# Patient Record
Sex: FEMALE | Race: WHITE | Employment: OTHER | URBAN - METROPOLITAN AREA
[De-identification: names, ages, dates, MRNs, and addresses within clinical notes are randomized per-mention and may not be internally consistent; named-entity substitution may affect disease eponyms.]

---

## 2017-01-02 ENCOUNTER — OFFICE VISIT (OUTPATIENT)
Dept: ORTHOPEDICS | Facility: CLINIC | Age: 82
End: 2017-01-02

## 2017-01-02 VITALS
BODY MASS INDEX: 31.5 KG/M2 | WEIGHT: 196 LBS | DIASTOLIC BLOOD PRESSURE: 62 MMHG | HEIGHT: 66 IN | SYSTOLIC BLOOD PRESSURE: 122 MMHG

## 2017-01-02 DIAGNOSIS — M17.31 POST-TRAUMATIC OSTEOARTHRITIS OF RIGHT KNEE: Primary | ICD-10-CM

## 2017-01-02 RX ORDER — TRIAMCINOLONE ACETONIDE 40 MG/ML
40 INJECTION, SUSPENSION INTRA-ARTICULAR; INTRAMUSCULAR ONCE
Qty: 2 ML | Refills: 0 | OUTPATIENT
Start: 2017-01-02 | End: 2017-01-02

## 2017-01-02 NOTE — PROGRESS NOTES
" Subjective:   Diandra Beck is a 82 year old female who complains of right knee pain, requesting an injection. Her last injection was 10/2014 with Dr. Mcclellan.   More achey stiff pain in the knee with stairs and at night.  Overall continued to walk daily.  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    Background:   Date of injury: None   Duration of symptoms: 10 years  Mechanism of Injury: Chronic; Unknown   Aggravating factors: Stairs  Relieving Factors: Biofreeze  Prior Evaluation: Prior Physician Evalutation: Dr. Jules and X-rays    PAST MEDICAL, SOCIAL, SURGICAL AND FAMILY HISTORY: She  has a past medical history of NONSPECIFIC MEDICAL HISTORY (10/4/89); NONSPECIFIC MEDICAL HISTORY (1/9/94); NONSPECIFIC MEDICAL HISTORY (9/94); NONSPECIFIC MEDICAL HISTORY (1/99); and Malignant neoplasm of breast (female), unspecified site.  She  has past surgical history that includes EXCISION BREAST LESION, OPEN >=1; EXCISION BREAST LESION, OPEN >=1; APPENDECTOMY; REMOVAL GALLBLADDER; and DILATION/CURETTAGE DIAG/THER NON OB (2001).  Her family history includes Alcohol/Drug in her brother; Breast Cancer in her sister; CANCER in her mother; DIABETES in her brother; HEART DISEASE in her brother and brother.  She reports that she has never smoked. She has never used smokeless tobacco. She reports that she does not drink alcohol or use illicit drugs.    ALLERGIES: She is allergic to codeine and no known drug allergies.    CURRENT MEDICATIONS: She has a current medication list which includes the following prescription(s): anastrozole, atorvastatin, and acetaminophen.     REVIEW OF SYSTEMS: 3 point review of systems is negative except as noted above.     Exam:   /62 mmHg  Ht 5' 6\" (1.676 m)  Wt 196 lb (88.905 kg)  BMI 31.65 kg/m2     MUSCULOSKELETAL:   R knee no effusion or redness  Tender medial, lateral joint space and pf facets  AROM 3-125       Assessment/Plan:   R knee " osteoarthritis    Plan  Patient requests repeat repeat knee steroid injection today.  Last in 2014 helped with goals of walking.    Plan  Risks and benefits reviewed including bleeding and < 1 % change of infection and patient wished to proceed.    A steroid injection was performed at R knee using 8cc 1% plain Lidocaine and 80 mg of Kenalog. This was well tolerated.  We can repeat after 3 months as needed    Angel Brock MD CAQ

## 2017-01-02 NOTE — Clinical Note
"  1/2/2017      RE: Diandra Beck  [address] [place] [address]        Subjective:   Diandra Beck is a 82 year old female who complains of right knee pain, requesting an injection. Her last injection was 10/2014 with Dr. Mcclellan.   More achey stiff pain in the knee with stairs and at night.  Overall continued to walk daily.  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    Background:   Date of injury: None   Duration of symptoms: 10 years  Mechanism of Injury: Chronic; Unknown   Aggravating factors: Stairs  Relieving Factors: Biofreeze  Prior Evaluation: Prior Physician Evalutation: Dr. Jules and X-rays    PAST MEDICAL, SOCIAL, SURGICAL AND FAMILY HISTORY: She  has a past medical history of NONSPECIFIC MEDICAL HISTORY (10/4/89); NONSPECIFIC MEDICAL HISTORY (1/9/94); NONSPECIFIC MEDICAL HISTORY (9/94); NONSPECIFIC MEDICAL HISTORY (1/99); and Malignant neoplasm of breast (female), unspecified site.  She  has past surgical history that includes EXCISION BREAST LESION, OPEN >=1; EXCISION BREAST LESION, OPEN >=1; APPENDECTOMY; REMOVAL GALLBLADDER; and DILATION/CURETTAGE DIAG/THER NON OB (2001).  Her family history includes Alcohol/Drug in her brother; Breast Cancer in her sister; CANCER in her mother; DIABETES in her brother; HEART DISEASE in her brother and brother.  She reports that she has never smoked. She has never used smokeless tobacco. She reports that she does not drink alcohol or use illicit drugs.    ALLERGIES: She is allergic to codeine and no known drug allergies.    CURRENT MEDICATIONS: She has a current medication list which includes the following prescription(s): anastrozole, atorvastatin, and acetaminophen.     REVIEW OF SYSTEMS: 3 point review of systems is negative except as noted above.     Exam:   /62 mmHg  Ht 5' 6\" (1.676 m)  Wt 196 lb (88.905 kg)  BMI 31.65 kg/m2     MUSCULOSKELETAL:   R knee no effusion or redness  Tender medial, lateral joint " space and pf facets  AROM 3-125       Assessment/Plan:   R knee osteoarthritis    Plan  Patient requests repeat repeat knee steroid injection today.  Last in 2014 helped with goals of walking.    Plan  Risks and benefits reviewed including bleeding and < 1 % change of infection and patient wished to proceed.    A steroid injection was performed at R knee using 8cc 1% plain Lidocaine and 80 mg of Kenalog. This was well tolerated.  We can repeat after 3 months as needed    Angel Brock MD CAQ

## 2017-01-02 NOTE — NURSING NOTE
[address] [address]  Dept: [phone]  ______________________________________________________________________________    Patient: Diandra Beck   : 10/29/1934   MRN: [id number]   2017    INVASIVE PROCEDURE SAFETY CHECKLIST    Date: 2017   Procedure: Right knee CSI with Kenalog  Patient Name: Diandra Beck  MRN: [id number]  YOB: 1934    Action: Complete sections as appropriate. Any discrepancy results in a HARD COPY until resolved.     PRE PROCEDURE:  Patient ID verified with 2 identifiers (name and  or MRN): Yes  Procedure and site verified with patient/designee (when able): Yes  Accurate consent documentation in medical record: Yes  H&P (or appropriate assessment) documented in medical record: Yes  H&P must be up to 20 days prior to procedure and updates within 24 hours of procedure as applicable: NA  Relevant diagnostic and radiology test results appropriately labeled and displayed as applicable: Yes  Procedure site(s) marked with provider initials: NA    TIMEOUT:  Time-Out performed immediately prior to starting procedure, including verbal and active participation of all team members addressing the following:Yes  * Correct patient identify  * Confirmed that the correct side and site are marked  * An accurate procedure consent form  * Agreement on the procedure to be done  * Correct patient position  * Relevant images and results are properly labeled and appropriately displayed  * The need to administer antibiotics or fluids for irrigation purposes during the procedure as applicable   * Safety precautions based on patient history or medication use    DURING PROCEDURE: Verification of correct person, site, and procedures any time the responsibility for care of the patient is transferred to another member of the care team.

## 2017-06-03 ENCOUNTER — HEALTH MAINTENANCE LETTER (OUTPATIENT)
Age: 82
End: 2017-06-03

## 2017-06-13 ENCOUNTER — TRANSFERRED RECORDS (OUTPATIENT)
Dept: HEALTH INFORMATION MANAGEMENT | Facility: CLINIC | Age: 82
End: 2017-06-13

## 2017-07-10 ENCOUNTER — TRANSFERRED RECORDS (OUTPATIENT)
Dept: HEALTH INFORMATION MANAGEMENT | Facility: CLINIC | Age: 82
End: 2017-07-10

## 2017-08-30 ENCOUNTER — OFFICE VISIT (OUTPATIENT)
Dept: ORTHOPEDICS | Facility: CLINIC | Age: 82
End: 2017-08-30

## 2017-08-30 VITALS — RESPIRATION RATE: 16 BRPM | WEIGHT: 187 LBS | BODY MASS INDEX: 30.05 KG/M2 | HEIGHT: 66 IN

## 2017-08-30 DIAGNOSIS — M17.11 PRIMARY LOCALIZED OSTEOARTHROSIS, LOWER LEG, RIGHT: Primary | ICD-10-CM

## 2017-08-30 RX ORDER — LETROZOLE 2.5 MG/1
2.5 TABLET, FILM COATED ORAL DAILY
COMMUNITY

## 2017-08-30 RX ORDER — TRIAMCINOLONE ACETONIDE 40 MG/ML
40 INJECTION, SUSPENSION INTRA-ARTICULAR; INTRAMUSCULAR ONCE
Qty: 1 ML | Refills: 0 | OUTPATIENT
Start: 2017-08-30 | End: 2017-08-30

## 2017-08-30 NOTE — NURSING NOTE
[address] [address]  Dept: [phone]  ______________________________________________________________________________    Patient: Diandra Beck   : 10/29/1934   MRN: [id number]   2017    INVASIVE PROCEDURE SAFETY CHECKLIST    Date: 2017   Procedure:Right knee kenalog injection  Patient Name: Diandra Beck  MRN: [id number]  YOB: 1934    Action: Complete sections as appropriate. Any discrepancy results in a HARD COPY until resolved.     PRE PROCEDURE:  Patient ID verified with 2 identifiers (name and  or MRN): Yes  Procedure and site verified with patient/designee (when able): Yes  Accurate consent documentation in medical record: Yes  H&P (or appropriate assessment) documented in medical record: Yes  H&P must be up to 20 days prior to procedure and updates within 24 hours of procedure as applicable: NA  Relevant diagnostic and radiology test results appropriately labeled and displayed as applicable: Yes  Procedure site(s) marked with provider initials: NA    TIMEOUT:  Time-Out performed immediately prior to starting procedure, including verbal and active participation of all team members addressing the following:Yes  * Correct patient identify  * Confirmed that the correct side and site are marked  * An accurate procedure consent form  * Agreement on the procedure to be done  * Correct patient position  * Relevant images and results are properly labeled and appropriately displayed  * The need to administer antibiotics or fluids for irrigation purposes during the procedure as applicable   * Safety precautions based on patient history or medication use    DURING PROCEDURE: Verification of correct person, site, and procedures any time the responsibility for care of the patient is transferred to another member of the care team.     The following medication was given:     MEDICATION:  Kenalog 40 mg; 4 ml of 1%  lidocaine  ROUTE: Intra-Articular  SITE: Right knee  DOSE: Kenalog 40 mg; 4 ml of 1% lidocaine  LOT #: Kenalog: [id number]; Lidocaine: [id number]  :Kenalog: MANGO BCN; Lidocaine: [employer]  EXPIRATION DATE: Kenalo/19; Lidocaine: 3/21  NDC#: [id number]; Lidocaine: 64324-326-60   Was there drug waste? Yes  Amount of drug waste (mL): 16.  Reason for waste:  Single use vial Lidocaine      Jony Ricks ATC  2017

## 2017-08-30 NOTE — PROGRESS NOTES
" Subjective:   Diandra Beck is a 82 year old female who is here requesting a right knee CSI. Her last injection lasted 9 months.     Date of injury: NA  Date last seen: Visit date not found  Following Therapeutic Plan: Yes CSI on 1/2/17  Pain: Unchanged  Function: Unchanged  Interval History:      PAST MEDICAL, SOCIAL, SURGICAL AND FAMILY HISTORY: She  has a past medical history of Malignant neoplasm of breast (female), unspecified site; NONSPECIFIC MEDICAL HISTORY (10/4/89); NONSPECIFIC MEDICAL HISTORY (1/9/94); NONSPECIFIC MEDICAL HISTORY (9/94); and NONSPECIFIC MEDICAL HISTORY (1/99).  She  has a past surgical history that includes EXCISION BREAST LESION, OPEN >=1; EXCISION BREAST LESION, OPEN >=1; APPENDECTOMY; REMOVAL GALLBLADDER; and DILATION/CURETTAGE DIAG/THER NON OB (2001).  Her family history includes Alcohol/Drug in her brother; Breast Cancer in her sister; CANCER in her mother; DIABETES in her brother; HEART DISEASE in her brother and brother.  She reports that she has never smoked. She has never used smokeless tobacco. She reports that she does not drink alcohol or use illicit drugs.      ALLERGIES: She is allergic to codeine and no known drug allergies.    CURRENT MEDICATIONS: She has a current medication list which includes the following prescription(s): cholecalciferol, ribociclib succinate, letrozole, atorvastatin, acetaminophen, and anastrozole.     REVIEW OF SYSTEMS: 3 point review of systems is negative except as noted above.     Exam:   Resp 16  Ht 5' 6\" (1.676 m)  Wt 187 lb (84.8 kg)  BMI 30.18 kg/m2     CONSTITUTIONAL: healthy, alert and no distress  HEAD: Normocephalic. No masses, lesions, tenderness or abnormalities  SKIN: no suspicious lesions or rashes  GAIT: normal  NEUROLOGIC: Non-focal  PSYCHIATRIC: affect normal/bright and mentation appears normal.    MUSCULOSKELETAL:       right  Knee Exam  No joint effusion, No redness;Tenderness lateral joint line; ROM unremarkable; Strength " 5/5 ext, 5/5 flexion;No pain or opening with varus or valgus stress test; Neg Lachmans; Neg McMurrays; Neg Squat test; Neg patellar apprehension test, Neg pseudocompression test, hip IR/ER did not cause pain         Assessment/Plan:   R knee lateral compartment OA  --good relief with pain for 9 months, now pain affecting sleep, and would like repeat injection. Symptoms otherwise unchanged.    Risks and benefits of pain relief vs infection discussed and patient wished to proceed.  A steroid injection was performed at R knee using 4 cc 1% plain Lidocaine and 40 mg of Kenalog. This was well tolerated.  Continue walking  Repeat after 3 months as needed for mod-severe uncontrolled pain    Angel Brock MD CAQ

## 2017-08-30 NOTE — MR AVS SNAPSHOT
"              After Visit Summary   2017    Diandra Beck    MRN: [id number]           Patient Information     Date Of Birth          10/29/1934        Visit Information        Provider Department      2017 9:30 AM Angel Brock MD [facility]        Today's Diagnoses     Primary localized osteoarthrosis, lower leg, right    -  1       Follow-ups after your visit        Who to contact     Please call your clinic at [phone] to:    Ask questions about your health    Make or cancel appointments    Discuss your medicines    Learn about your test results    Speak to your doctor   If you have compliments or concerns about an experience at your clinic, or if you wish to file a complaint, please contact [facility] Patient Relations at [phone] or email us at [email or web address]         Additional Information About Your Visit        MyChart Information     [employer] is an electronic gateway that provides easy, online access to your medical records. With [employer], you can request a clinic appointment, read your test results, renew a prescription or communicate with your care team.     To sign up for [employer] visit the website at [email or web address]   You will be asked to enter the access code listed below, as well as some personal information. Please follow the directions to create your username and password.     Your access code is: [email or web address]  Expires: 2017  6:30 AM     Your access code will  in 90 days. If you need help or a new code, please contact your [facility] or call [phone] for assistance.        Care EveryWhere ID     This is your Care EveryWhere ID. This could be used by other organizations to access your [facility] medical records  [id number]        Your Vitals Were     Respirations Height BMI (Body Mass Index)             16 5' 6\" (1.676 m) 30.18 kg/m2          Blood Pressure " from Last 3 Encounters:   01/02/17 122/62   12/02/16 122/62   08/11/16 157/77    Weight from Last 3 Encounters:   08/30/17 187 lb (84.8 kg)   01/02/17 196 lb (88.9 kg)   12/02/16 196 lb 1.6 oz (89 kg)              We Performed the Following     Large Joint/Bursa injection and/or drainage - Unilateral (Shoulder, Knee) [20610]     TRIAMCINOLONE ACET INJ NOS          Today's Medication Changes          These changes are accurate as of: 8/30/17  9:44 AM.  If you have any questions, ask your nurse or doctor.               Start taking these medicines.        Dose/Directions    triamcinolone acetonide 40 MG/ML injection   Commonly known as:  KENALOG   Used for:  Primary localized osteoarthrosis, lower leg, right        Dose:  40 mg   1 mL (40 mg) by INTRA-ARTICULAR route once for 1 dose   Quantity:  1 mL   Refills:  0            Where to get your medicines      Some of these will need a paper prescription and others can be bought over the counter.  Ask your nurse if you have questions.     You don't need a prescription for these medications     triamcinolone acetonide 40 MG/ML injection                Primary Care Provider Office Phone # Fax #    Mike Jules [phone]       [address] [address]        Equal Access to Services     JORGE ZHANG AH: Hadii pratik greeneo Sopatrice, waaxda luqadaha, qaybta kaalmada adeegyada, clifford mercado. So [facility] [phone].    ATENCIÓN: Si habla español, tiene a sanders disposición servicios gratuitos de asistencia lingüística. Llame al [phone].    We comply with applicable federal civil rights laws and [place] laws. We do not discriminate on the basis of race, color, national origin, age, disability sex, sexual orientation or gender identity.            Thank you!     Thank you for choosing [facility]  for your care. Our goal is always to provide you with excellent care. Hearing back from our  patients is one way we can continue to improve our services. Please take a few minutes to complete the written survey that you may receive in the mail after your visit with us. Thank you!             Your Updated Medication List - Protect others around you: Learn how to safely use, store and throw away your medicines at www.disposemymeds.org.          This list is accurate as of: 8/30/17  9:44 AM.  Always use your most recent med list.                   Brand Name Dispense Instructions for use Diagnosis    anastrozole 1 MG tablet    ARIMIDEX     1 mg        atorvastatin 40 MG tablet    LIPITOR    90 tablet    Take 1 tablet (40 mg) by mouth daily    Hypercholesteremia       KISQALI 600 DOSE PO      Take 600 mg by mouth        letrozole 2.5 MG tablet    FEMARA     Take 2.5 mg by mouth daily        triamcinolone acetonide 40 MG/ML injection    KENALOG    1 mL    1 mL (40 mg) by INTRA-ARTICULAR route once for 1 dose    Primary localized osteoarthrosis, lower leg, right       TYLENOL PO      Take 500 mg by mouth        VITAMIN D (CHOLECALCIFEROL) PO      Take 1,000 Units by mouth daily

## 2017-08-30 NOTE — LETTER
"  8/30/2017      RE: Diandra Beck  [address] [place] [address]        Subjective:   Diandra Beck is a 82 year old female who is here requesting a right knee CSI. Her last injection lasted 9 months.     Date of injury: NA  Date last seen: Visit date not found  Following Therapeutic Plan: Yes CSI on 1/2/17  Pain: Unchanged  Function: Unchanged  Interval History:      PAST MEDICAL, SOCIAL, SURGICAL AND FAMILY HISTORY: She  has a past medical history of Malignant neoplasm of breast (female), unspecified site; NONSPECIFIC MEDICAL HISTORY (10/4/89); NONSPECIFIC MEDICAL HISTORY (1/9/94); NONSPECIFIC MEDICAL HISTORY (9/94); and NONSPECIFIC MEDICAL HISTORY (1/99).  She  has a past surgical history that includes EXCISION BREAST LESION, OPEN >=1; EXCISION BREAST LESION, OPEN >=1; APPENDECTOMY; REMOVAL GALLBLADDER; and DILATION/CURETTAGE DIAG/THER NON OB (2001).  Her family history includes Alcohol/Drug in her brother; Breast Cancer in her sister; CANCER in her mother; DIABETES in her brother; HEART DISEASE in her brother and brother.  She reports that she has never smoked. She has never used smokeless tobacco. She reports that she does not drink alcohol or use illicit drugs.      ALLERGIES: She is allergic to codeine and no known drug allergies.    CURRENT MEDICATIONS: She has a current medication list which includes the following prescription(s): cholecalciferol, ribociclib succinate, letrozole, atorvastatin, acetaminophen, and anastrozole.     REVIEW OF SYSTEMS: 3 point review of systems is negative except as noted above.     Exam:   Resp 16  Ht 5' 6\" (1.676 m)  Wt 187 lb (84.8 kg)  BMI 30.18 kg/m2     CONSTITUTIONAL: healthy, alert and no distress  HEAD: Normocephalic. No masses, lesions, tenderness or abnormalities  SKIN: no suspicious lesions or rashes  GAIT: normal  NEUROLOGIC: Non-focal  PSYCHIATRIC: affect normal/bright and mentation appears normal.    MUSCULOSKELETAL:       right  Knee " Exam  No joint effusion, No redness;Tenderness lateral joint line; ROM unremarkable; Strength 5/5 ext, 5/5 flexion;No pain or opening with varus or valgus stress test; Neg Lachmans; Neg McMurrays; Neg Squat test; Neg patellar apprehension test, Neg pseudocompression test, hip IR/ER did not cause pain         Assessment/Plan:   R knee lateral compartment OA  --good relief with pain for 9 months, now pain affecting sleep, and would like repeat injection. Symptoms otherwise unchanged.    Risks and benefits of pain relief vs infection discussed and patient wished to proceed.  A steroid injection was performed at R knee using 4 cc 1% plain Lidocaine and 40 mg of Kenalog. This was well tolerated.  Continue walking  Repeat after 3 months as needed for mod-severe uncontrolled pain    Angel Brock MD CAQ

## 2017-09-08 ENCOUNTER — TRANSFERRED RECORDS (OUTPATIENT)
Dept: HEALTH INFORMATION MANAGEMENT | Facility: CLINIC | Age: 82
End: 2017-09-08

## 2017-11-27 ENCOUNTER — TRANSFERRED RECORDS (OUTPATIENT)
Dept: HEALTH INFORMATION MANAGEMENT | Facility: CLINIC | Age: 82
End: 2017-11-27

## 2017-12-29 DIAGNOSIS — E78.00 HYPERCHOLESTEREMIA: ICD-10-CM

## 2018-01-02 NOTE — TELEPHONE ENCOUNTER
atorvastatin (LIPITOR) 40 MG tablet      Last Written Prescription Date:  12/2/16  Last Fill Quantity: 90,   # refills: 3  Last Office Visit : 12/2/16  Future Office visit:  none    Routing refill request to provider for review/approval because:  Failed protocol- labs.    Appointment overdue- Scheduling has been notified to contact the pt for appointment.

## 2018-01-03 RX ORDER — ATORVASTATIN CALCIUM 40 MG/1
40 TABLET, FILM COATED ORAL DAILY
Qty: 30 TABLET | Refills: 0 | Status: SHIPPED | OUTPATIENT
Start: 2018-01-03 | End: 2018-04-20

## 2018-02-12 ENCOUNTER — TRANSFERRED RECORDS (OUTPATIENT)
Dept: HEALTH INFORMATION MANAGEMENT | Facility: CLINIC | Age: 83
End: 2018-02-12

## 2018-03-21 ENCOUNTER — TRANSFERRED RECORDS (OUTPATIENT)
Dept: HEALTH INFORMATION MANAGEMENT | Facility: CLINIC | Age: 83
End: 2018-03-21

## 2018-04-02 ENCOUNTER — TRANSFERRED RECORDS (OUTPATIENT)
Dept: HEALTH INFORMATION MANAGEMENT | Facility: CLINIC | Age: 83
End: 2018-04-02

## 2018-04-02 ENCOUNTER — OFFICE VISIT (OUTPATIENT)
Dept: ORTHOPEDICS | Facility: CLINIC | Age: 83
End: 2018-04-02
Payer: MEDICARE

## 2018-04-02 VITALS — DIASTOLIC BLOOD PRESSURE: 68 MMHG | SYSTOLIC BLOOD PRESSURE: 141 MMHG | RESPIRATION RATE: 16 BRPM | HEART RATE: 82 BPM

## 2018-04-02 DIAGNOSIS — M17.11 PRIMARY OSTEOARTHRITIS OF RIGHT KNEE: Primary | ICD-10-CM

## 2018-04-02 NOTE — PROGRESS NOTES
Subjective:   Diandra Beck is a 83 year old female who is here requesting a right knee kenalog injection for her right knee osteoarthritis. She notes that the injection lasted 7 months.     Date of injury: NA  Date last seen: Visit date not found  Following Therapeutic Plan: Yes Injection 8/30/17  Pain: Unchanged  Function: Unchanged  Interval History:      PAST MEDICAL, SOCIAL, SURGICAL AND FAMILY HISTORY: She  has a past medical history of Malignant neoplasm of breast (female), unspecified site; NONSPECIFIC MEDICAL HISTORY (10/4/89); NONSPECIFIC MEDICAL HISTORY (1/9/94); NONSPECIFIC MEDICAL HISTORY (9/94); and NONSPECIFIC MEDICAL HISTORY (1/99).  She  has a past surgical history that includes EXCISION BREAST LESION, OPEN >=1; EXCISION BREAST LESION, OPEN >=1; APPENDECTOMY; REMOVAL GALLBLADDER; and DILATION/CURETTAGE DIAG/THER NON OB (2001).  Her family history includes Alcohol/Drug in her brother; Breast Cancer in her sister; CANCER in her mother; DIABETES in her brother; HEART DISEASE in her brother and brother.  She reports that she has never smoked. She has never used smokeless tobacco. She reports that she does not drink alcohol or use illicit drugs.    ALLERGIES: She is allergic to codeine and no known drug allergies.    CURRENT MEDICATIONS: She has a current medication list which includes the following prescription(s): ibuprofen, atorvastatin, cholecalciferol, ribociclib succinate, letrozole, anastrozole, and acetaminophen.     REVIEW OF SYSTEMS: 3 point review of systems is negative except as noted above.     Exam:   /68  Pulse 82  Resp 16     Alert and pleasant    MUSCULOSKELETAL:     rightKnee Exam  No joint effusion, No redness;Tenderness medial joint line;ROM unremarkable; Strength 5/5 ext, 5/5 flexion;No pain or opening with varus or valgus stress test; Neg Lachmans; Neg McMurrays; Neg Squat test; Neg patellar apprehension test, Neg pseudocompression test, hip IR/ER did not cause pain  3-125     Assessment/Plan:   R knee osteoarthritis    Plan  --ok for repeat injection, risks and benefits  Discussed and she wished to proceed.  A steroid injection was performed at R knee using 4cc 1% plain Lidocaine and 40 mg of Kenalog. This was well tolerated.    Ma repeat after 3 months as needed for symptoms.    Angel Brock MD CAQ

## 2018-04-02 NOTE — LETTER
4/2/2018      RE: Diandra Beck  [address] [place] [address]        Subjective:   Diandra Beck is a 83 year old female who is here requesting a right knee kenalog injection for her right knee osteoarthritis. She notes that the injection lasted 7 months.     Date of injury: NA  Date last seen: Visit date not found  Following Therapeutic Plan: Yes Injection 8/30/17  Pain: Unchanged  Function: Unchanged  Interval History:      PAST MEDICAL, SOCIAL, SURGICAL AND FAMILY HISTORY: She  has a past medical history of Malignant neoplasm of breast (female), unspecified site; NONSPECIFIC MEDICAL HISTORY (10/4/89); NONSPECIFIC MEDICAL HISTORY (1/9/94); NONSPECIFIC MEDICAL HISTORY (9/94); and NONSPECIFIC MEDICAL HISTORY (1/99).  She  has a past surgical history that includes EXCISION BREAST LESION, OPEN >=1; EXCISION BREAST LESION, OPEN >=1; APPENDECTOMY; REMOVAL GALLBLADDER; and DILATION/CURETTAGE DIAG/THER NON OB (2001).  Her family history includes Alcohol/Drug in her brother; Breast Cancer in her sister; CANCER in her mother; DIABETES in her brother; HEART DISEASE in her brother and brother.  She reports that she has never smoked. She has never used smokeless tobacco. She reports that she does not drink alcohol or use illicit drugs.    ALLERGIES: She is allergic to codeine and no known drug allergies.    CURRENT MEDICATIONS: She has a current medication list which includes the following prescription(s): ibuprofen, atorvastatin, cholecalciferol, ribociclib succinate, letrozole, anastrozole, and acetaminophen.     REVIEW OF SYSTEMS: 3 point review of systems is negative except as noted above.     Exam:   /68  Pulse 82  Resp 16     Alert and pleasant    MUSCULOSKELETAL:     rightKnee Exam  No joint effusion, No redness;Tenderness medial joint line;ROM unremarkable; Strength 5/5 ext, 5/5 flexion;No pain or opening with varus or valgus stress test; Neg Lachmans; Neg McMurrays; Neg Squat test; Neg  patellar apprehension test, Neg pseudocompression test, hip IR/ER did not cause pain 3-125     Assessment/Plan:   R knee osteoarthritis    Plan  --ok for repeat injection, risks and benefits  Discussed and she wished to proceed.  A steroid injection was performed at R knee using 4cc 1% plain Lidocaine and 40 mg of Kenalog. This was well tolerated.    Ma repeat after 3 months as needed for symptoms.    Angel Brock MD CAQ

## 2018-04-02 NOTE — MR AVS SNAPSHOT
After Visit Summary   2018    Diandra Beck    MRN: [id number]           Patient Information     Date Of Birth          10/29/1934        Visit Information        Provider Department      2018 4:00 PM Angel Brock MD [facility]        Today's Diagnoses     Primary osteoarthritis of right knee    -  1       Follow-ups after your visit        Who to contact     Please call your clinic at [phone] to:    Ask questions about your health    Make or cancel appointments    Discuss your medicines    Learn about your test results    Speak to your doctor            Additional Information About Your Visit        MyChart Information     Keisense is an electronic gateway that provides easy, online access to your medical records. With Keisense, you can request a clinic appointment, read your test results, renew a prescription or communicate with your care team.     To sign up for Keisense visit the website at [email or web address]   You will be asked to enter the access code listed below, as well as some personal information. Please follow the directions to create your username and password.     Your access code is: [email or web address]  Expires: 2018  6:31 AM     Your access code will  in 90 days. If you need help or a new code, please contact your [facility] or call [phone] for assistance.        Care EveryWhere ID     This is your Care EveryWhere ID. This could be used by other organizations to access your [facility] medical records  [id number]        Your Vitals Were     Pulse Respirations                82 16           Blood Pressure from Last 3 Encounters:   18 141/68   17 122/62   16 122/62    Weight from Last 3 Encounters:   17 187 lb (84.8 kg)   17 196 lb (88.9 kg)   16 196 lb 1.6 oz (89 kg)              We Performed the Following     Large Joint/Bursa injection and/or drainage - Unilateral  (Shoulder, Knee) [20610]     TRIAMCINOLONE ACET INJ NOS          Today's Medication Changes          These changes are accurate as of 4/2/18 11:59 PM.  If you have any questions, ask your nurse or doctor.               Start taking these medicines.        Dose/Directions    triamcinolone acetonide 40 MG/ML injection   Commonly known as:  KENALOG   Used for:  Primary osteoarthritis of right knee   Started by:  Angel Brock MD        Dose:  40 mg   1 mL (40 mg) by INTRA-ARTICULAR route once for 1 dose   Quantity:  1 mL   Refills:  0            Where to get your medicines      Some of these will need a paper prescription and others can be bought over the counter.  Ask your nurse if you have questions.     You don't need a prescription for these medications     triamcinolone acetonide 40 MG/ML injection                Primary Care Provider Office Phone # Fax #    Mike Jules [phone]       [address] [address]        Equal Access to Services     JORGE ZHANG AH: Hadii pratik ku hadasho Soomaali, waaxda luqadaha, qaybta kaalmada adeegyada, waxay kobyin haybulmaron tory larson . So [facility] [phone].    ATENCIÓN: Si habla español, tiene a sanders disposición servicios gratuitos de asistencia lingüística. Llame al [phone].    We comply with applicable federal civil rights laws and [place] laws. We do not discriminate on the basis of race, color, national origin, age, disability, sex, sexual orientation, or gender identity.            Thank you!     Thank you for choosing [facility]  for your care. Our goal is always to provide you with excellent care. Hearing back from our patients is one way we can continue to improve our services. Please take a few minutes to complete the written survey that you may receive in the mail after your visit with us. Thank you!             Your Updated Medication List - Protect others around you: Learn how to safely  use, store and throw away your medicines at www.disposemymeds.org.          This list is accurate as of 4/2/18 11:59 PM.  Always use your most recent med list.                   Brand Name Dispense Instructions for use Diagnosis    anastrozole 1 MG tablet    ARIMIDEX     1 mg        atorvastatin 40 MG tablet    LIPITOR    30 tablet    Take 1 tablet (40 mg) by mouth daily . Patient needs to see primary provider and have labs for further refills.    Hypercholesteremia       IBUPROFEN PO           KISQALI 600 DOSE PO      Take 400 mg by mouth        letrozole 2.5 MG tablet    FEMARA     Take 2.5 mg by mouth daily        triamcinolone acetonide 40 MG/ML injection    KENALOG    1 mL    1 mL (40 mg) by INTRA-ARTICULAR route once for 1 dose    Primary osteoarthritis of right knee       TYLENOL PO      Take 500 mg by mouth        VITAMIN D (CHOLECALCIFEROL) PO      Take 1,000 Units by mouth daily

## 2018-04-02 NOTE — NURSING NOTE
[address] [address]  Dept: [phone]  ______________________________________________________________________________    Patient: Diandra Beck   : 10/29/1934   MRN: [id number]   2018    INVASIVE PROCEDURE SAFETY CHECKLIST    Date: 2017   Procedure:Right knee kenalog injection    Patient Name: Diandra Beck  MRN: [id number]  YOB: 1934    Action: Complete sections as appropriate. Any discrepancy results in a HARD COPY until resolved.     PRE PROCEDURE:  Patient ID verified with 2 identifiers (name and  or MRN): Yes  Procedure and site verified with patient/designee (when able): Yes  Accurate consent documentation in medical record: Yes  H&P (or appropriate assessment) documented in medical record: Yes  H&P must be up to 20 days prior to procedure and updates within 24 hours of procedure as applicable: NA  Relevant diagnostic and radiology test results appropriately labeled and displayed as applicable: Yes  Procedure site(s) marked with provider initials: NA    TIMEOUT:  Time-Out performed immediately prior to starting procedure, including verbal and active participation of all team members addressing the following:Yes  * Correct patient identify  * Confirmed that the correct side and site are marked  * An accurate procedure consent form  * Agreement on the procedure to be done  * Correct patient position  * Relevant images and results are properly labeled and appropriately displayed  * The need to administer antibiotics or fluids for irrigation purposes during the procedure as applicable   * Safety precautions based on patient history or medication use    DURING PROCEDURE: Verification of correct person, site, and procedures any time the responsibility for care of the patient is transferred to another member of the care team.     The following medication was given:     MEDICATION:  Kenalog 40 mg; 4ml of 1%  lidocaine  ROUTE: Intra-Articular  SITE: Right knee  DOSE: Kenalog 40 mg; 4ml of 1% lidocaine  LOT #: Kenalog: [id number]; Lidocaine: [id number]  : Kenalog: [employer]; Lidocaine: [employer]  EXPIRATION DATE: Kenalo/19; Lidocaine:   NDC#: Kenalo697[address]-9[address]-3; Lidocaine: 63323-201-10   Was there drug waste? Yes  Amount of drug waste (mL): 6.  Reason for waste:  As per MD Paul Ricks, ATC  2018

## 2018-04-05 RX ORDER — TRIAMCINOLONE ACETONIDE 40 MG/ML
40 INJECTION, SUSPENSION INTRA-ARTICULAR; INTRAMUSCULAR ONCE
Qty: 1 ML | Refills: 0 | OUTPATIENT
Start: 2018-04-05 | End: 2018-04-05

## 2018-04-18 ENCOUNTER — TRANSFERRED RECORDS (OUTPATIENT)
Dept: HEALTH INFORMATION MANAGEMENT | Facility: CLINIC | Age: 83
End: 2018-04-18

## 2018-04-20 ENCOUNTER — OFFICE VISIT (OUTPATIENT)
Dept: FAMILY MEDICINE | Facility: CLINIC | Age: 83
End: 2018-04-20
Payer: MEDICARE

## 2018-04-20 VITALS
SYSTOLIC BLOOD PRESSURE: 124 MMHG | WEIGHT: 183 LBS | HEART RATE: 85 BPM | BODY MASS INDEX: 29.54 KG/M2 | DIASTOLIC BLOOD PRESSURE: 81 MMHG

## 2018-04-20 DIAGNOSIS — D64.9 ANEMIA, UNSPECIFIED TYPE: ICD-10-CM

## 2018-04-20 DIAGNOSIS — E78.00 HYPERCHOLESTEREMIA: ICD-10-CM

## 2018-04-20 DIAGNOSIS — Z23 NEED FOR PROPHYLACTIC VACCINATION WITH TETANUS-DIPHTHERIA (TD): Primary | ICD-10-CM

## 2018-04-20 DIAGNOSIS — Z09 HOSPITAL DISCHARGE FOLLOW-UP: ICD-10-CM

## 2018-04-20 DIAGNOSIS — L03.113 CELLULITIS OF RIGHT UPPER EXTREMITY: ICD-10-CM

## 2018-04-20 DIAGNOSIS — I89.0 LYMPHEDEMA: ICD-10-CM

## 2018-04-20 LAB
ALBUMIN SERPL-MCNC: 3.4 G/DL (ref 3.4–5)
ALP SERPL-CCNC: 98 U/L (ref 40–150)
ALT SERPL W P-5'-P-CCNC: 21 U/L (ref 0–50)
ANION GAP SERPL CALCULATED.3IONS-SCNC: 5 MMOL/L (ref 3–14)
AST SERPL W P-5'-P-CCNC: 15 U/L (ref 0–45)
BILIRUB SERPL-MCNC: 0.5 MG/DL (ref 0.2–1.3)
BUN SERPL-MCNC: 18 MG/DL (ref 7–30)
CALCIUM SERPL-MCNC: 8.9 MG/DL (ref 8.5–10.1)
CHLORIDE SERPL-SCNC: 105 MMOL/L (ref 94–109)
CHOLEST SERPL-MCNC: 137 MG/DL
CO2 SERPL-SCNC: 29 MMOL/L (ref 20–32)
CREAT SERPL-MCNC: 1.02 MG/DL (ref 0.52–1.04)
ERYTHROCYTE [DISTWIDTH] IN BLOOD BY AUTOMATED COUNT: 13.5 % (ref 10–15)
FOLATE SERPL-MCNC: 25.3 NG/ML
GFR SERPL CREATININE-BSD FRML MDRD: 52 ML/MIN/1.7M2
GLUCOSE SERPL-MCNC: 126 MG/DL (ref 70–99)
HCT VFR BLD AUTO: 36 % (ref 35–47)
HDLC SERPL-MCNC: 56 MG/DL
HGB BLD-MCNC: 11.8 G/DL (ref 11.7–15.7)
LDLC SERPL CALC-MCNC: 66 MG/DL
MCH RBC QN AUTO: 33.2 PG (ref 26.5–33)
MCHC RBC AUTO-ENTMCNC: 32.8 G/DL (ref 31.5–36.5)
MCV RBC AUTO: 101 FL (ref 78–100)
NONHDLC SERPL-MCNC: 80 MG/DL
PLATELET # BLD AUTO: 137 10E9/L (ref 150–450)
POTASSIUM SERPL-SCNC: 4.3 MMOL/L (ref 3.4–5.3)
PROT SERPL-MCNC: 6.5 G/DL (ref 6.8–8.8)
RBC # BLD AUTO: 3.55 10E12/L (ref 3.8–5.2)
SODIUM SERPL-SCNC: 139 MMOL/L (ref 133–144)
TRIGL SERPL-MCNC: 72 MG/DL
VIT B12 SERPL-MCNC: 263 PG/ML (ref 193–986)
WBC # BLD AUTO: 3.2 10E9/L (ref 4–11)

## 2018-04-20 PROCEDURE — 82746 ASSAY OF FOLIC ACID SERUM: CPT | Performed by: FAMILY MEDICINE

## 2018-04-20 RX ORDER — CLARITHROMYCIN 500 MG
TABLET ORAL
Qty: 4 TABLET | Refills: 0 | Status: SHIPPED | OUTPATIENT
Start: 2018-04-20

## 2018-04-20 RX ORDER — ATORVASTATIN CALCIUM 40 MG/1
40 TABLET, FILM COATED ORAL DAILY
Qty: 30 TABLET | Refills: 0 | Status: SHIPPED | OUTPATIENT
Start: 2018-04-20 | End: 2018-05-17

## 2018-04-20 RX ORDER — AMOXICILLIN 875 MG
875 TABLET ORAL 2 TIMES DAILY
Qty: 20 TABLET | Refills: 0 | Status: SHIPPED | OUTPATIENT
Start: 2018-04-20

## 2018-04-20 RX ORDER — SULFAMETHOXAZOLE/TRIMETHOPRIM 800-160 MG
1 TABLET ORAL 2 TIMES DAILY
Qty: 20 TABLET | Refills: 0 | Status: SHIPPED | OUTPATIENT
Start: 2018-04-20

## 2018-04-20 ASSESSMENT — PAIN SCALES - GENERAL: PAINLEVEL: MILD PAIN (2)

## 2018-04-20 NOTE — MR AVS SNAPSHOT
After Visit Summary   2018    Diandra Beck    MRN: [id number]           Patient Information     Date Of Birth          10/29/1934        Visit Information        Provider Department      2018 8:05 AM Mike Jules MD [facility]        Today's Diagnoses     Need for prophylactic vaccination with tetanus-diphtheria (TD)    -  1    Lymphedema        Cellulitis of right upper extremity        Hypercholesteremia        Anemia, unspecified type           Follow-ups after your visit        Follow-up notes from your care team     Return in about 1 year (around 2019).      Future tests that were ordered for you today     Open Future Orders        Priority Expected Expires Ordered    CBC with platelets Routine 2018    Vitamin B12 Routine 2018    Comprehensive metabolic panel Routine 2018    Lipid panel reflex to direct LDL Fasting Routine 2018    Folate Routine 2018            Who to contact     Please call your clinic at [phone] to:    Ask questions about your health    Make or cancel appointments    Discuss your medicines    Learn about your test results    Speak to your doctor            Additional Information About Your Visit        MyChart Information     Transphormt is an electronic gateway that provides easy, online access to your medical records. With Sendori, you can request a clinic appointment, read your test results, renew a prescription or communicate with your care team.     To sign up for Transphormt visit the website at [email or web address]   You will be asked to enter the access code listed below, as well as some personal information. Please follow the directions to create your username and password.     Your access code is: [email or web address]  Expires: 2018  6:31 AM     Your access code will  in 90 days. If you need help or  a new code, please contact your [facility] or call [phone] for assistance.        Care EveryWhere ID     This is your Care EveryWhere ID. This could be used by other organizations to access your [facility] medical records  [id number]        Your Vitals Were     Pulse Breastfeeding? BMI (Body Mass Index)             85 No 29.54 kg/m2          Blood Pressure from Last 3 Encounters:   04/20/18 124/81   04/02/18 141/68   01/02/17 122/62    Weight from Last 3 Encounters:   04/20/18 83 kg (183 lb)   08/30/17 84.8 kg (187 lb)   01/02/17 88.9 kg (196 lb)              We Performed the Following     TDAP ( BOOSTRIX AGES 10-64)          Today's Medication Changes          These changes are accurate as of 4/20/18  8:29 AM.  If you have any questions, ask your nurse or doctor.               Start taking these medicines.        Dose/Directions    amoxicillin 875 MG tablet   Commonly known as:  AMOXIL   Used for:  Cellulitis of right upper extremity   Started by:  Mike Jules MD        Dose:  875 mg   Take 1 tablet (875 mg) by mouth 2 times daily   Quantity:  20 tablet   Refills:  0       clarithromycin 500 MG tablet   Commonly known as:  BIAXIN   Used for:  Lymphedema   Started by:  Mike Jules MD        Take one pill po 1/2 hour before dental work   Quantity:  4 tablet   Refills:  0       sulfamethoxazole-trimethoprim 800-160 MG per tablet   Commonly known as:  BACTRIM DS/SEPTRA DS   Used for:  Cellulitis of right upper extremity   Started by:  Mike Jules MD        Dose:  1 tablet   Take 1 tablet by mouth 2 times daily   Quantity:  20 tablet   Refills:  0            Where to get your medicines      These medications were sent to [id number] - [place], [place] - [address] AT [facility] [address] [place] [address]    Hours:  24-hours Phone:  [phone]     amoxicillin 875 MG tablet    atorvastatin 40 MG tablet     clarithromycin 500 MG tablet    sulfamethoxazole-trimethoprim 800-160 MG per tablet                Primary Care Provider Office Phone # Fax #    Mike Jules [phone]       [address] [address]        Equal Access to Services     JORGE ZHANG : Hadii pratik chaidez haddaveo Soomaali, waaxda luqadaha, qaybta kaalmada adeegyada, waxrachel jimenez marlinshiloh spears jose rceline mercado. So [facility] [phone].    ATENCIÓN: Si habla español, tiene a sanders disposición servicios gratuitos de asistencia lingüística. Llame al [phone].    We comply with applicable federal civil rights laws and [place] laws. We do not discriminate on the basis of race, color, national origin, age, disability, sex, sexual orientation, or gender identity.            Thank you!     Thank you for choosing [facility]  for your care. Our goal is always to provide you with excellent care. Hearing back from our patients is one way we can continue to improve our services. Please take a few minutes to complete the written survey that you may receive in the mail after your visit with us. Thank you!             Your Updated Medication List - Protect others around you: Learn how to safely use, store and throw away your medicines at www.disposemymeds.org.          This list is accurate as of 4/20/18  8:29 AM.  Always use your most recent med list.                   Brand Name Dispense Instructions for use Diagnosis    amoxicillin 875 MG tablet    AMOXIL    20 tablet    Take 1 tablet (875 mg) by mouth 2 times daily    Cellulitis of right upper extremity       anastrozole 1 MG tablet    ARIMIDEX     1 mg        atorvastatin 40 MG tablet    LIPITOR    30 tablet    Take 1 tablet (40 mg) by mouth daily . Patient needs to see primary provider and have labs for further refills.    Hypercholesteremia       clarithromycin 500 MG tablet    BIAXIN    4 tablet    Take one pill po 1/2 hour before dental work    Lymphedema        IBUPROFEN PO           KISQALI 600 DOSE PO      Take 400 mg by mouth        letrozole 2.5 MG tablet    FEMARA     Take 2.5 mg by mouth daily        sulfamethoxazole-trimethoprim 800-160 MG per tablet    BACTRIM DS/SEPTRA DS    20 tablet    Take 1 tablet by mouth 2 times daily    Cellulitis of right upper extremity       TYLENOL PO      Take 500 mg by mouth        VITAMIN D (CHOLECALCIFEROL) PO      Take 1,000 Units by mouth daily

## 2018-04-20 NOTE — PROGRESS NOTES
[facility]  Primary Care Center   Mike Jules MD  04/20/2018      Chief Complain t:   Physical       History of Present Illness:   Diandra Beck is a 83 year old female with a history of breast cancer and degenerative disc disease who presents for a physical. She recently had numerous hospital visits with cellulitis. She was in the hospital from 3/2-3/5, treated with vancomycin and ancef. Her infections present initially as pain in her arm. In the past she has used prophylactic antibiotics for travel.     She mentions that she had a root canal and was not given antibiotics afterwards. She felt sick afterwards for about a month before her cellulitis flare up.     She recently saw orthopedics for a corticosteroid injection which relieved her osteoarthritis in her knee.    Other concerns discussed:  She also requested a refill of her statin.    Health Maintenance:  Immunizations (Tetanus every 10 years, Influenza yearly, Pneumonia PPV-23 and PCV-13 age ? 65 or sooner if risk factors) - Recommended, administered today     Review of Systems:   Pertinent items are noted in HPI, remainder of complete ROS is negative.      Active Medications:      Acetaminophen (TYLENOL PO), Take 500 mg by mouth, Disp: , Rfl:      anastrozole (ARIMIDEX) 1 MG tablet, 1 mg, Disp: , Rfl: 4     atorvastatin (LIPITOR) 40 MG tablet, Take 1 tablet (40 mg) by mouth daily . Patient needs to see primary provider and have labs for further refills., Disp: 30 tablet, Rfl: 0     IBUPROFEN PO, , Disp: , Rfl:      letrozole (FEMARA) 2.5 MG tablet, Take 2.5 mg by mouth daily, Disp: , Rfl:      Ribociclib Succinate (KISQALI 600 DOSE PO), Take 400 mg by mouth , Disp: , Rfl:      VITAMIN D, CHOLECALCIFEROL, PO, Take 1,000 Units by mouth daily, Disp: , Rfl:       Allergies:   Codeine and No known drug allergies      Past Medical History:  Diagnosis     Malignant neoplasm of female breast (H)     DDD (degenerative disc disease), lumbar     Lumbar  spondylosis     Primary localized osteoarthrosis, lower leg     Tear of lateral cartilage or meniscus of knee, current     ACP (advance care planning)     Past Surgical History:  Procedure Date     C APPENDECTOMY      C REMOVAL GALLBLADDER      HC DILATION/CURETTAGE DIAG/THER NON OB 2001    hysteroscopy D and C 2001 for benign endometrial polyp     HC EXCISION BREAST LESION, OPEN >=1      HC EXCISION BREAST LESION, OPEN >=1        Family History:   Brothers: Heat disease with bypass, diabetes, alcohol/drug  Sister: Breast cancer  Mother: Cancer     Social History:   Single  Never smoker     Physical Exam:   /81  Pulse 85  Wt 83 kg (183 lb)  Breastfeeding? No  BMI 29.54 kg/m2   Constitutional: Alert. In no distress.  Head: The scalp, face, and head appear normal.  Musculoskeletal: Extremities appear normal. No gross deformities noted.   Neurologic: Gait normal. Speech is normal and fluent.  Psychiatric: Mentation appears normal. Normal affect.   Cardiovascular: RRR. No murmurs, clicks, gallops, or rub.    Assessment and Plan:  Need for prophylactic vaccination with tetanus-diphtheria (TD)  - TDAP ( BOOSTRIX AGES 10-64)    Lymphedema  - clarithromycin (BIAXIN) 500 MG tablet  Dispense: 4 tablet; Refill: 0    Cellulitis of right upper extremity   Discussed prescribing antibiotics to use on symptom onset.  - amoxicillin (AMOXIL) 875 MG tablet  Dispense: 20 tablet; Refill: 0  - sulfamethoxazole-trimethoprim (BACTRIM DS/SEPTRA DS) 800-160 MG per tablet  Dispense: 20 tablet; Refill: 0    Hypercholesteremia  - atorvastatin (LIPITOR) 40 MG tablet  Dispense: 30 tablet; Refill: 0  - Comprehensive metabolic panel  - Lipid panel reflex to direct LDL Fasting    Anemia, unspecified type  - CBC with platelets  - Vitamin B12  - Folate        Follow-up: Data Unavailable         Scribe Disclosure:   Norris BACON, am serving as a scribe to document services personally performed by Mike Jules MD at this visit,  based upon the provider's statements to me. All documentation has been reviewed by the aforementioned provider prior to being entered into the official medical record.     Portions of this medical record were completed by a scribe. UPON MY REVIEW AND AUTHENTICATION BY ELECTRONIC SIGNATURE, this confirms (a) I performed the applicable clinical services, and (b) the record is accurate.   Mike Jules MD

## 2018-04-20 NOTE — PATIENT INSTRUCTIONS
Primary Care Center: [phone]     [facility] Medication Refill Request Information:  * Please contact your pharmacy regarding ANY request for medication refills.  ** [facility] Prescription Fax = [phone]  * Please allow 3 business days for routine medication refills.  * Please allow 5 business days for controlled substance medication refills.     Primary Care Center Test Result notification information:  *You will be notified with in 7-10 days of your appointment day regarding the results of your test.  If you are on MyChart you will be notified as soon as the provider has reviewed the results and signed off on them.    Please check with your insurance about Shingrix vaccine coverage. If covered by insurance, call the clinic at [phone] to schedule a nurse visit to get the vaccine.

## 2018-04-20 NOTE — NURSING NOTE
Chief Complaint   Patient presents with     Physical     Patient here for physical.       Piedad Estrada CMA at 7:49 AM on 4/20/2018.

## 2018-05-15 ENCOUNTER — TRANSFERRED RECORDS (OUTPATIENT)
Dept: HEALTH INFORMATION MANAGEMENT | Facility: CLINIC | Age: 83
End: 2018-05-15

## 2018-05-17 DIAGNOSIS — E78.00 HYPERCHOLESTEREMIA: ICD-10-CM

## 2018-05-20 NOTE — TELEPHONE ENCOUNTER
atorvastatin (LIPITOR) 40 MG tablet  Last Written Prescription Date:  4/20/18  Last Fill Quantity: 30,   # refills: 0  Last Office Visit :4/20/18  Future Office visit:  None    Routing  Because:  High med alert

## 2018-05-22 RX ORDER — ATORVASTATIN CALCIUM 40 MG/1
40 TABLET, FILM COATED ORAL DAILY
Qty: 90 TABLET | Refills: 3 | Status: SHIPPED | OUTPATIENT
Start: 2018-05-22

## 2018-06-09 ENCOUNTER — HEALTH MAINTENANCE LETTER (OUTPATIENT)
Age: 83
End: 2018-06-09

## 2018-07-11 ENCOUNTER — TRANSFERRED RECORDS (OUTPATIENT)
Dept: HEALTH INFORMATION MANAGEMENT | Facility: CLINIC | Age: 83
End: 2018-07-11

## 2018-09-18 ENCOUNTER — TRANSFERRED RECORDS (OUTPATIENT)
Dept: HEALTH INFORMATION MANAGEMENT | Facility: CLINIC | Age: 83
End: 2018-09-18

## 2018-10-09 ENCOUNTER — RECORDS - HEALTHEAST (OUTPATIENT)
Dept: LAB | Facility: CLINIC | Age: 83
End: 2018-10-09

## 2018-10-09 LAB
ALBUMIN SERPL-MCNC: 3.7 G/DL (ref 3.5–5)
ALP SERPL-CCNC: 135 U/L (ref 45–120)
ALT SERPL W P-5'-P-CCNC: 19 U/L (ref 0–45)
ANION GAP SERPL CALCULATED.3IONS-SCNC: 7 MMOL/L (ref 5–18)
AST SERPL W P-5'-P-CCNC: 18 U/L (ref 0–40)
BILIRUB DIRECT SERPL-MCNC: 0.2 MG/DL
BILIRUB SERPL-MCNC: 0.8 MG/DL (ref 0–1)
BUN SERPL-MCNC: 17 MG/DL (ref 8–28)
CALCIUM SERPL-MCNC: 9.1 MG/DL (ref 8.5–10.5)
CHLORIDE BLD-SCNC: 104 MMOL/L (ref 98–107)
CO2 SERPL-SCNC: 28 MMOL/L (ref 22–31)
CREAT SERPL-MCNC: 0.78 MG/DL (ref 0.6–1.1)
ERYTHROCYTE [DISTWIDTH] IN BLOOD BY AUTOMATED COUNT: 13.4 % (ref 11–14.5)
GFR SERPL CREATININE-BSD FRML MDRD: >60 ML/MIN/1.73M2
GLUCOSE BLD-MCNC: 121 MG/DL (ref 70–125)
HCT VFR BLD AUTO: 39 % (ref 35–47)
HGB BLD-MCNC: 12.6 G/DL (ref 12–16)
INR PPP: 1.09 (ref 0.9–1.1)
MCH RBC QN AUTO: 29.9 PG (ref 27–34)
MCHC RBC AUTO-ENTMCNC: 32.3 G/DL (ref 32–36)
MCV RBC AUTO: 92 FL (ref 80–100)
PLATELET # BLD AUTO: 209 THOU/UL (ref 140–440)
PMV BLD AUTO: 9.7 FL (ref 8.5–12.5)
POTASSIUM BLD-SCNC: 4.4 MMOL/L (ref 3.5–5)
PROT SERPL-MCNC: 6.6 G/DL (ref 6–8)
RBC # BLD AUTO: 4.22 MILL/UL (ref 3.8–5.4)
SODIUM SERPL-SCNC: 139 MMOL/L (ref 136–145)
TSH SERPL DL<=0.005 MIU/L-ACNC: 4.17 UIU/ML (ref 0.3–5)
WBC: 4.3 THOU/UL (ref 4–11)

## 2018-10-10 LAB — 25(OH)D3 SERPL-MCNC: 17.6 NG/ML (ref 30–80)

## 2018-10-12 ENCOUNTER — TRANSFERRED RECORDS (OUTPATIENT)
Dept: HEALTH INFORMATION MANAGEMENT | Facility: CLINIC | Age: 83
End: 2018-10-12

## 2018-11-09 ENCOUNTER — TRANSFERRED RECORDS (OUTPATIENT)
Dept: HEALTH INFORMATION MANAGEMENT | Facility: CLINIC | Age: 83
End: 2018-11-09

## 2018-12-14 ENCOUNTER — TRANSFERRED RECORDS (OUTPATIENT)
Dept: HEALTH INFORMATION MANAGEMENT | Facility: CLINIC | Age: 83
End: 2018-12-14

## 2019-02-08 ENCOUNTER — TRANSFERRED RECORDS (OUTPATIENT)
Dept: HEALTH INFORMATION MANAGEMENT | Facility: CLINIC | Age: 84
End: 2019-02-08

## 2019-03-08 ENCOUNTER — TRANSFERRED RECORDS (OUTPATIENT)
Dept: HEALTH INFORMATION MANAGEMENT | Facility: CLINIC | Age: 84
End: 2019-03-08

## 2019-05-20 ENCOUNTER — TRANSFERRED RECORDS (OUTPATIENT)
Dept: HEALTH INFORMATION MANAGEMENT | Facility: CLINIC | Age: 84
End: 2019-05-20

## 2019-06-28 ENCOUNTER — TRANSFERRED RECORDS (OUTPATIENT)
Dept: HEALTH INFORMATION MANAGEMENT | Facility: CLINIC | Age: 84
End: 2019-06-28

## (undated) RX ORDER — TRIAMCINOLONE ACETONIDE 40 MG/ML
INJECTION, SUSPENSION INTRA-ARTICULAR; INTRAMUSCULAR
Status: DISPENSED
Start: 2018-04-02

## (undated) RX ORDER — TRIAMCINOLONE ACETONIDE 40 MG/ML
INJECTION, SUSPENSION INTRA-ARTICULAR; INTRAMUSCULAR
Status: DISPENSED
Start: 2017-01-02

## (undated) RX ORDER — TRIAMCINOLONE ACETONIDE 40 MG/ML
INJECTION, SUSPENSION INTRA-ARTICULAR; INTRAMUSCULAR
Status: DISPENSED
Start: 2017-08-30

## (undated) RX ORDER — LIDOCAINE HYDROCHLORIDE 10 MG/ML
INJECTION, SOLUTION INFILTRATION; PERINEURAL
Status: DISPENSED
Start: 2018-04-02

## (undated) RX ORDER — LIDOCAINE HYDROCHLORIDE 10 MG/ML
INJECTION, SOLUTION INFILTRATION; PERINEURAL
Status: DISPENSED
Start: 2017-08-30

## (undated) RX ORDER — LIDOCAINE HYDROCHLORIDE 10 MG/ML
INJECTION, SOLUTION INFILTRATION; PERINEURAL
Status: DISPENSED
Start: 2017-01-02